# Patient Record
Sex: MALE | Employment: FULL TIME | URBAN - NONMETROPOLITAN AREA
[De-identification: names, ages, dates, MRNs, and addresses within clinical notes are randomized per-mention and may not be internally consistent; named-entity substitution may affect disease eponyms.]

---

## 2023-04-24 ENCOUNTER — OFFICE VISIT (OUTPATIENT)
Dept: PRIMARY CARE | Facility: CLINIC | Age: 53
End: 2023-04-24
Payer: COMMERCIAL

## 2023-04-24 VITALS
SYSTOLIC BLOOD PRESSURE: 158 MMHG | HEART RATE: 76 BPM | BODY MASS INDEX: 27.62 KG/M2 | HEIGHT: 71 IN | WEIGHT: 197.3 LBS | DIASTOLIC BLOOD PRESSURE: 84 MMHG

## 2023-04-24 DIAGNOSIS — F17.200 SMOKER: ICD-10-CM

## 2023-04-24 DIAGNOSIS — E78.5 HYPERLIPIDEMIA, UNSPECIFIED HYPERLIPIDEMIA TYPE: ICD-10-CM

## 2023-04-24 DIAGNOSIS — Z00.00 HEALTHCARE MAINTENANCE: ICD-10-CM

## 2023-04-24 DIAGNOSIS — I10 HYPERTENSION, UNSPECIFIED TYPE: ICD-10-CM

## 2023-04-24 DIAGNOSIS — I10 ESSENTIAL HYPERTENSION: ICD-10-CM

## 2023-04-24 DIAGNOSIS — F17.200 TOBACCO USE DISORDER: ICD-10-CM

## 2023-04-24 DIAGNOSIS — K21.9 GASTROESOPHAGEAL REFLUX DISEASE WITHOUT ESOPHAGITIS: ICD-10-CM

## 2023-04-24 DIAGNOSIS — R06.83 SNORING: Primary | ICD-10-CM

## 2023-04-24 PROBLEM — R31.9 HEMATURIA: Status: ACTIVE | Noted: 2023-04-24

## 2023-04-24 PROBLEM — M54.50 CHRONIC LOW BACK PAIN: Status: ACTIVE | Noted: 2023-04-24

## 2023-04-24 PROBLEM — I44.7 LEFT BUNDLE-BRANCH BLOCK: Status: ACTIVE | Noted: 2023-04-24

## 2023-04-24 PROBLEM — G89.29 CHRONIC LOW BACK PAIN: Status: ACTIVE | Noted: 2023-04-24

## 2023-04-24 PROCEDURE — 3079F DIAST BP 80-89 MM HG: CPT | Performed by: FAMILY MEDICINE

## 2023-04-24 PROCEDURE — 4004F PT TOBACCO SCREEN RCVD TLK: CPT | Performed by: FAMILY MEDICINE

## 2023-04-24 PROCEDURE — 99214 OFFICE O/P EST MOD 30 MIN: CPT | Performed by: FAMILY MEDICINE

## 2023-04-24 PROCEDURE — 3077F SYST BP >= 140 MM HG: CPT | Performed by: FAMILY MEDICINE

## 2023-04-24 RX ORDER — AMLODIPINE BESYLATE 10 MG/1
10 TABLET ORAL DAILY
Qty: 90 TABLET | Refills: 3 | Status: SHIPPED | OUTPATIENT
Start: 2023-04-24 | End: 2024-05-28

## 2023-04-24 RX ORDER — LISINOPRIL 10 MG/1
10 TABLET ORAL DAILY
Qty: 30 TABLET | Refills: 5 | Status: SHIPPED | OUTPATIENT
Start: 2023-04-24 | End: 2023-10-23

## 2023-04-24 RX ORDER — DOCUSATE SODIUM 100 MG/1
100 CAPSULE, LIQUID FILLED ORAL 2 TIMES DAILY
COMMUNITY

## 2023-04-24 RX ORDER — OMEPRAZOLE 20 MG/1
TABLET, DELAYED RELEASE ORAL
COMMUNITY
Start: 2017-03-20

## 2023-04-24 RX ORDER — IBUPROFEN 200 MG
1 CAPSULE ORAL DAILY
COMMUNITY

## 2023-04-24 RX ORDER — BUPROPION HYDROCHLORIDE 150 MG/1
150 TABLET, EXTENDED RELEASE ORAL DAILY
Qty: 180 TABLET | Refills: 3 | Status: SHIPPED | OUTPATIENT
Start: 2023-04-24 | End: 2024-05-06

## 2023-04-24 RX ORDER — ATORVASTATIN CALCIUM 20 MG/1
20 TABLET, FILM COATED ORAL DAILY
Qty: 90 TABLET | Refills: 3 | Status: SHIPPED | OUTPATIENT
Start: 2023-04-24 | End: 2024-04-29

## 2023-04-24 RX ORDER — ASCORBIC ACID 500 MG
TABLET ORAL
COMMUNITY

## 2023-04-24 NOTE — PATIENT INSTRUCTIONS
Add lisinopril 10 mg daily to regimen, call any concerns.  Followup one month with labs ( can be nonfasting or fasting for what we are checking) prior to the appt.  Someone will contact you regarding the sleep study.

## 2023-04-24 NOTE — PROGRESS NOTES
Patient presents for periodic surveillance of chronic medical problems.     Subjective  Josh Skinner is a 52 y.o. male who presents for Med check.  HPI  Htn, not yet at goal on current regimen, recommend addition of lisinopril 10 mg, side effects including cough and angioedema reviewed  Chest pain, has been to cardiology, not felt to have significant cardiac disease at this time  Tobacco use, warned of risks, advised to quit, on wellbutrin  GERD, stable  Snores, doesn't feel well rested, recommend sleep study and he is agreeable    Review of Systems   All other systems reviewed and are negative.  .    Objective     Visit Vitals  /84 (BP Location: Left arm, Patient Position: Sitting)   Pulse 76      Physical Exam  Vitals and nursing note reviewed.   Constitutional:       General: He is not in acute distress.     Appearance: Normal appearance. He is not toxic-appearing.   HENT:      Head: Normocephalic and atraumatic.   Cardiovascular:      Rate and Rhythm: Normal rate and regular rhythm.      Heart sounds: No murmur heard.  Pulmonary:      Effort: Pulmonary effort is normal.      Breath sounds: Normal breath sounds.   Musculoskeletal:      Cervical back: Neck supple. No rigidity.      Comments:     Skin:     General: Skin is warm and dry.   Neurological:      General: No focal deficit present.      Mental Status: He is alert and oriented to person, place, and time.   Psychiatric:         Mood and Affect: Mood normal.         Behavior: Behavior normal.         Assessment/Plan   Problem List Items Addressed This Visit          Circulatory    Essential hypertension    Relevant Medications    lisinopril 10 mg tablet    Other Relevant Orders    Home sleep apnea test (HSAT)    Follow Up In Primary Care    Basic metabolic panel       Digestive    Gastroesophageal reflux disease without esophagitis       Other    Smoker    Tobacco use disorder     Other Visit Diagnoses       Snoring    -  Primary    Relevant Orders     Home sleep apnea test (HSAT)                   Annamaria An MD

## 2023-04-25 NOTE — PROGRESS NOTES
NO PRECERT NEEDED. ORDER EMAILED TO RADHIKA AT [facility] TO SCHEDULE WITH PATIENT.

## 2023-05-09 ENCOUNTER — LAB (OUTPATIENT)
Dept: LAB | Facility: LAB | Age: 53
End: 2023-05-09
Payer: COMMERCIAL

## 2023-05-09 DIAGNOSIS — I10 ESSENTIAL HYPERTENSION: ICD-10-CM

## 2023-05-09 LAB
ANION GAP IN SER/PLAS: 8 MMOL/L (ref 10–20)
CALCIUM (MG/DL) IN SER/PLAS: 9.2 MG/DL (ref 8.6–10.3)
CARBON DIOXIDE, TOTAL (MMOL/L) IN SER/PLAS: 30 MMOL/L (ref 21–32)
CHLORIDE (MMOL/L) IN SER/PLAS: 107 MMOL/L (ref 98–107)
CREATININE (MG/DL) IN SER/PLAS: 1.02 MG/DL (ref 0.5–1.3)
GFR MALE: 88 ML/MIN/1.73M2
GLUCOSE (MG/DL) IN SER/PLAS: 82 MG/DL (ref 74–99)
POTASSIUM (MMOL/L) IN SER/PLAS: 4 MMOL/L (ref 3.5–5.3)
SODIUM (MMOL/L) IN SER/PLAS: 141 MMOL/L (ref 136–145)
UREA NITROGEN (MG/DL) IN SER/PLAS: 10 MG/DL (ref 6–23)

## 2023-05-09 PROCEDURE — 80048 BASIC METABOLIC PNL TOTAL CA: CPT

## 2023-05-09 PROCEDURE — 36415 COLL VENOUS BLD VENIPUNCTURE: CPT

## 2023-05-16 DIAGNOSIS — R06.83 SNORING: ICD-10-CM

## 2023-05-16 DIAGNOSIS — I10 ESSENTIAL HYPERTENSION: ICD-10-CM

## 2023-05-16 PROBLEM — G47.33 OBSTRUCTIVE SLEEP APNEA SYNDROME: Status: ACTIVE | Noted: 2023-05-16

## 2023-05-30 ENCOUNTER — OFFICE VISIT (OUTPATIENT)
Dept: PRIMARY CARE | Facility: CLINIC | Age: 53
End: 2023-05-30
Payer: COMMERCIAL

## 2023-05-30 VITALS
DIASTOLIC BLOOD PRESSURE: 60 MMHG | HEART RATE: 78 BPM | WEIGHT: 193 LBS | SYSTOLIC BLOOD PRESSURE: 134 MMHG | HEIGHT: 71 IN | BODY MASS INDEX: 27.02 KG/M2

## 2023-05-30 DIAGNOSIS — G47.33 OBSTRUCTIVE SLEEP APNEA SYNDROME: Primary | ICD-10-CM

## 2023-05-30 DIAGNOSIS — L23.7 ALLERGIC CONTACT DERMATITIS DUE TO PLANTS, EXCEPT FOOD: ICD-10-CM

## 2023-05-30 DIAGNOSIS — I10 ESSENTIAL HYPERTENSION: ICD-10-CM

## 2023-05-30 DIAGNOSIS — R73.09 ELEVATED GLUCOSE: ICD-10-CM

## 2023-05-30 PROCEDURE — 3075F SYST BP GE 130 - 139MM HG: CPT | Performed by: FAMILY MEDICINE

## 2023-05-30 PROCEDURE — 99214 OFFICE O/P EST MOD 30 MIN: CPT | Performed by: FAMILY MEDICINE

## 2023-05-30 PROCEDURE — 3078F DIAST BP <80 MM HG: CPT | Performed by: FAMILY MEDICINE

## 2023-05-30 PROCEDURE — 4004F PT TOBACCO SCREEN RCVD TLK: CPT | Performed by: FAMILY MEDICINE

## 2023-05-30 RX ORDER — METHYLPREDNISOLONE 4 MG/1
TABLET ORAL
Qty: 21 TABLET | Refills: 0 | Status: SHIPPED | OUTPATIENT
Start: 2023-05-30 | End: 2023-06-06

## 2023-05-30 NOTE — PROGRESS NOTES
Subjective  Josh Skinner is a 52 y.o. male who presents for 1 month follow up (Rev labs. Discuss poison ivy x 3 days. ).  HPI  Follow up adding lisinopril for hypertension.  First day felt quite lightheaded/like going to pass out, checked bp and got a systolic of 112.  That sensation has continued but to a lesser degree.  Recommend try taking the lisinopril at night, and call if symptoms do not completely resolve.  Goal for bp to be at goal with meds causing no symptoms.   Has poison ivy on arms, abdomen.  Discussed a medrol dose pack, side effects reviewed.  Sleep apnea, equipment set up pending, call concerns  BMP normal.  Review of Systems   All other systems reviewed and are negative.  .    Objective     Visit Vitals  /60 (BP Location: Left arm, Patient Position: Sitting)   Pulse 78      Physical Exam  Vitals reviewed.   Constitutional:       Appearance: Normal appearance.   HENT:      Head: Normocephalic.   Pulmonary:      Effort: Pulmonary effort is normal.   Skin:     Comments: Age related skin changes/sun damage posterior forearms  Rash forearms c/w contact dermatitis   Neurological:      General: No focal deficit present.      Mental Status: He is alert. Mental status is at baseline.   Psychiatric:         Mood and Affect: Mood normal.         Behavior: Behavior normal.       Lab on 05/09/2023   Component Date Value Ref Range Status    Glucose 05/09/2023 82  74 - 99 mg/dL Final    Sodium 05/09/2023 141  136 - 145 mmol/L Final    Potassium 05/09/2023 4.0  3.5 - 5.3 mmol/L Final    Chloride 05/09/2023 107  98 - 107 mmol/L Final    Bicarbonate 05/09/2023 30  21 - 32 mmol/L Final    Anion Gap 05/09/2023 8 (L)  10 - 20 mmol/L Final    Urea Nitrogen 05/09/2023 10  6 - 23 mg/dL Final    Creatinine 05/09/2023 1.02  0.50 - 1.30 mg/dL Final    GFR MALE 05/09/2023 88  >90 mL/min/1.73m2 Final     CALCULATIONS OF ESTIMATED GFR ARE PERFORMED   USING THE 2021 CKD-EPI STUDY REFIT EQUATION   WITHOUT THE RACE  VARIABLE FOR THE IDMS-TRACEABLE   CREATININE METHODS.    https://jasn.asnjournals.org/content/early/2021/09/22/ASN.1893929710    Calcium 05/09/2023 9.2  8.6 - 10.3 mg/dL Final         Assessment/Plan   Problem List Items Addressed This Visit          Nervous    Obstructive sleep apnea syndrome - Primary       Circulatory    Essential hypertension    Relevant Orders    Follow Up In Primary Care    CBC and Auto Differential    Comprehensive Metabolic Panel    Lipid Panel     Other Visit Diagnoses       Allergic contact dermatitis due to plants, except food        Relevant Medications    methylPREDNISolone (Medrol Dospak) 4 mg tablets    Elevated glucose        Relevant Orders    Hemoglobin A1C                   Annamaria An MD

## 2023-06-19 ENCOUNTER — TELEPHONE (OUTPATIENT)
Dept: PRIMARY CARE | Facility: CLINIC | Age: 53
End: 2023-06-19
Payer: COMMERCIAL

## 2023-07-03 ENCOUNTER — OFFICE VISIT (OUTPATIENT)
Dept: PRIMARY CARE | Facility: CLINIC | Age: 53
End: 2023-07-03
Payer: COMMERCIAL

## 2023-07-03 VITALS
HEIGHT: 71 IN | DIASTOLIC BLOOD PRESSURE: 74 MMHG | WEIGHT: 186 LBS | SYSTOLIC BLOOD PRESSURE: 120 MMHG | HEART RATE: 96 BPM | BODY MASS INDEX: 26.04 KG/M2

## 2023-07-03 DIAGNOSIS — Z20.818 EXPOSURE TO STREP THROAT: Primary | ICD-10-CM

## 2023-07-03 DIAGNOSIS — J02.9 SORE THROAT: ICD-10-CM

## 2023-07-03 PROCEDURE — 3074F SYST BP LT 130 MM HG: CPT | Performed by: FAMILY MEDICINE

## 2023-07-03 PROCEDURE — 3078F DIAST BP <80 MM HG: CPT | Performed by: FAMILY MEDICINE

## 2023-07-03 PROCEDURE — 4004F PT TOBACCO SCREEN RCVD TLK: CPT | Performed by: FAMILY MEDICINE

## 2023-07-03 PROCEDURE — 99213 OFFICE O/P EST LOW 20 MIN: CPT | Performed by: FAMILY MEDICINE

## 2023-07-03 RX ORDER — AMOXICILLIN 875 MG/1
875 TABLET, FILM COATED ORAL 2 TIMES DAILY
Qty: 20 TABLET | Refills: 0 | Status: SHIPPED | OUTPATIENT
Start: 2023-07-03 | End: 2023-07-13

## 2023-07-03 ASSESSMENT — ENCOUNTER SYMPTOMS: SORE THROAT: 1

## 2023-07-03 NOTE — PROGRESS NOTES
Subjective  Josh Skinner is a 52 y.o. male who presents for Sore Throat.  Sore Throat       Josh presents with illness started around Thursday. Sore trhoat, low grade fevers.  Grandchild who lives in the home tested positive for strep the week prior.   Appetite okay, swallowing secretions okay.    Review of Systems   HENT:  Positive for sore throat.    All other systems reviewed and are negative.  .    Objective     Visit Vitals  /74 (BP Location: Left arm, Patient Position: Sitting)   Pulse 96      Physical Exam  Vitals and nursing note reviewed.   Constitutional:       General: He is not in acute distress.     Appearance: Normal appearance. He is not toxic-appearing.   HENT:      Head: Normocephalic and atraumatic.      Mouth/Throat:      Mouth: Mucous membranes are moist.      Pharynx: Oropharyngeal exudate and posterior oropharyngeal erythema present.   Cardiovascular:      Rate and Rhythm: Normal rate and regular rhythm.      Heart sounds: No murmur heard.  Pulmonary:      Effort: Pulmonary effort is normal.      Breath sounds: Normal breath sounds.   Abdominal:      Palpations: Abdomen is soft.   Musculoskeletal:      Cervical back: Neck supple. No rigidity.      Comments: Normal gait   Skin:     General: Skin is warm and dry.   Neurological:      General: No focal deficit present.      Mental Status: He is alert and oriented to person, place, and time.   Psychiatric:         Mood and Affect: Mood normal.         Behavior: Behavior normal.         Assessment/Plan   Problem List Items Addressed This Visit    None  Visit Diagnoses       Exposure to strep throat    -  Primary    Relevant Medications    amoxicillin (Amoxil) 875 mg tablet    Sore throat        Relevant Medications    amoxicillin (Amoxil) 875 mg tablet                   Annamaria An MD

## 2023-07-03 NOTE — PROGRESS NOTES
C/O sore throat that started 6/30/23; grandchild lives with him who recently swabbed positive for strep; had fever over the weekend

## 2023-10-22 DIAGNOSIS — I10 ESSENTIAL HYPERTENSION: ICD-10-CM

## 2023-10-23 RX ORDER — LISINOPRIL 10 MG/1
10 TABLET ORAL DAILY
Qty: 90 TABLET | Refills: 1 | Status: SHIPPED | OUTPATIENT
Start: 2023-10-23 | End: 2023-11-30 | Stop reason: SDUPTHER

## 2023-11-30 ENCOUNTER — OFFICE VISIT (OUTPATIENT)
Dept: PRIMARY CARE | Facility: CLINIC | Age: 53
End: 2023-11-30
Payer: COMMERCIAL

## 2023-11-30 VITALS
OXYGEN SATURATION: 99 % | BODY MASS INDEX: 26.26 KG/M2 | SYSTOLIC BLOOD PRESSURE: 158 MMHG | WEIGHT: 187.6 LBS | HEART RATE: 81 BPM | DIASTOLIC BLOOD PRESSURE: 74 MMHG | HEIGHT: 71 IN

## 2023-11-30 DIAGNOSIS — Z28.21 PNEUMOCOCCAL VACCINATION DECLINED: ICD-10-CM

## 2023-11-30 DIAGNOSIS — F17.200 TOBACCO USE DISORDER: ICD-10-CM

## 2023-11-30 DIAGNOSIS — Z28.21 INFLUENZA VACCINATION DECLINED: Primary | ICD-10-CM

## 2023-11-30 DIAGNOSIS — I10 ESSENTIAL HYPERTENSION: ICD-10-CM

## 2023-11-30 DIAGNOSIS — K21.9 GASTROESOPHAGEAL REFLUX DISEASE WITHOUT ESOPHAGITIS: ICD-10-CM

## 2023-11-30 DIAGNOSIS — R26.89 BALANCE PROBLEMS: ICD-10-CM

## 2023-11-30 PROCEDURE — 4004F PT TOBACCO SCREEN RCVD TLK: CPT | Performed by: FAMILY MEDICINE

## 2023-11-30 PROCEDURE — 3078F DIAST BP <80 MM HG: CPT | Performed by: FAMILY MEDICINE

## 2023-11-30 PROCEDURE — 99214 OFFICE O/P EST MOD 30 MIN: CPT | Performed by: FAMILY MEDICINE

## 2023-11-30 PROCEDURE — 3077F SYST BP >= 140 MM HG: CPT | Performed by: FAMILY MEDICINE

## 2023-11-30 RX ORDER — LISINOPRIL 20 MG/1
20 TABLET ORAL DAILY
Qty: 30 TABLET | Refills: 2 | Status: SHIPPED | OUTPATIENT
Start: 2023-11-30 | End: 2024-02-26

## 2023-11-30 NOTE — PROGRESS NOTES
Patient presents for periodic surveillance of chronic medical problems.     Subjective  Josh Skinner is a 53 y.o. male who presents for No chief complaint on file..  HPI  Htn, not at goal, recommend increase lisinopril from 10 to 20 mg and followup in a month  On statin due to vascular risk, due to reassess lipids  GERD, stable on regimen  Smokes, warned of risks, advised to quit. Is interested in quitting and has tried a few times, on wellbutrin.  Has had for about a month a feeling of being off balance when he is standing still.  He has not fallen. Does not have it when seated or lying.  No spinning sensation.  Feels like going to fall but doesn't.  No chest pain, no shortness of breath, no vision changes, no spinning sensation.  Had cardiac workup about a year ago.      Review of Systems   All other systems reviewed and are negative.  .    Objective     Visit Vitals  /74 (BP Location: Left arm, Patient Position: Sitting, BP Cuff Size: Adult)   Pulse 81      Physical Exam  Vitals and nursing note reviewed.   Constitutional:       General: He is not in acute distress.     Appearance: Normal appearance. He is not toxic-appearing.   HENT:      Head: Normocephalic and atraumatic.   Cardiovascular:      Rate and Rhythm: Normal rate and regular rhythm.      Heart sounds: No murmur heard.  Pulmonary:      Effort: Pulmonary effort is normal.      Breath sounds: Normal breath sounds.   Musculoskeletal:      Cervical back: Neck supple. No rigidity.      Comments: Normal gait   Skin:     General: Skin is warm and dry.   Neurological:      General: No focal deficit present.      Mental Status: He is alert and oriented to person, place, and time.   Psychiatric:         Mood and Affect: Mood normal.         Behavior: Behavior normal.         Assessment/Plan   Problem List Items Addressed This Visit       Essential hypertension    Relevant Medications    lisinopril 20 mg tablet    Gastroesophageal reflux disease without  esophagitis    Tobacco use disorder     Other Visit Diagnoses       Influenza vaccination declined    -  Primary    Pneumococcal vaccination declined        Balance problems                       Annamaria An MD

## 2023-11-30 NOTE — PROGRESS NOTES
Subjective   Patient ID: Josh Skinner is a 53 y.o. male who presents for 6 month follow up. Patient states he has been having some balance issues x1 month. Denies falling     HPI     Review of Systems    Objective   There were no vitals taken for this visit.    Physical Exam    Assessment/Plan

## 2023-11-30 NOTE — PATIENT INSTRUCTIONS
Discussed workup for balance, and option of waiting until follow up to reassess.  Opted for reassess at follow up in one month unless symptoms worsen.  Call concerns.  Labs prior, to follow up in one month.    Increase dose of lisiopril from 10 mg to 20 mg daily.

## 2023-12-19 ENCOUNTER — LAB (OUTPATIENT)
Dept: LAB | Facility: LAB | Age: 53
End: 2023-12-19
Payer: COMMERCIAL

## 2023-12-19 DIAGNOSIS — I10 ESSENTIAL HYPERTENSION: ICD-10-CM

## 2023-12-19 DIAGNOSIS — R73.09 ELEVATED GLUCOSE: ICD-10-CM

## 2023-12-19 LAB
ALBUMIN SERPL BCP-MCNC: 4.2 G/DL (ref 3.4–5)
ALP SERPL-CCNC: 82 U/L (ref 33–120)
ALT SERPL W P-5'-P-CCNC: 25 U/L (ref 10–52)
ANION GAP SERPL CALC-SCNC: 9 MMOL/L (ref 10–20)
AST SERPL W P-5'-P-CCNC: 23 U/L (ref 9–39)
BASOPHILS # BLD AUTO: 0.09 X10*3/UL (ref 0–0.1)
BASOPHILS NFR BLD AUTO: 1.1 %
BILIRUB SERPL-MCNC: 0.4 MG/DL (ref 0–1.2)
BUN SERPL-MCNC: 15 MG/DL (ref 6–23)
CALCIUM SERPL-MCNC: 9 MG/DL (ref 8.6–10.3)
CHLORIDE SERPL-SCNC: 107 MMOL/L (ref 98–107)
CHOLEST SERPL-MCNC: 102 MG/DL (ref 0–199)
CHOLESTEROL/HDL RATIO: 2.5
CO2 SERPL-SCNC: 31 MMOL/L (ref 21–32)
CREAT SERPL-MCNC: 0.99 MG/DL (ref 0.5–1.3)
EOSINOPHIL # BLD AUTO: 0.27 X10*3/UL (ref 0–0.7)
EOSINOPHIL NFR BLD AUTO: 3.2 %
ERYTHROCYTE [DISTWIDTH] IN BLOOD BY AUTOMATED COUNT: 12 % (ref 11.5–14.5)
EST. AVERAGE GLUCOSE BLD GHB EST-MCNC: 108 MG/DL
GFR SERPL CREATININE-BSD FRML MDRD: >90 ML/MIN/1.73M*2
GLUCOSE SERPL-MCNC: 79 MG/DL (ref 74–99)
HBA1C MFR BLD: 5.4 %
HCT VFR BLD AUTO: 43.3 % (ref 41–52)
HDLC SERPL-MCNC: 41 MG/DL
HGB BLD-MCNC: 14.4 G/DL (ref 13.5–17.5)
IMM GRANULOCYTES # BLD AUTO: 0.02 X10*3/UL (ref 0–0.7)
IMM GRANULOCYTES NFR BLD AUTO: 0.2 % (ref 0–0.9)
LDLC SERPL CALC-MCNC: 48 MG/DL
LYMPHOCYTES # BLD AUTO: 1.52 X10*3/UL (ref 1.2–4.8)
LYMPHOCYTES NFR BLD AUTO: 18.1 %
MCH RBC QN AUTO: 31.3 PG (ref 26–34)
MCHC RBC AUTO-ENTMCNC: 33.3 G/DL (ref 32–36)
MCV RBC AUTO: 94 FL (ref 80–100)
MONOCYTES # BLD AUTO: 0.71 X10*3/UL (ref 0.1–1)
MONOCYTES NFR BLD AUTO: 8.4 %
NEUTROPHILS # BLD AUTO: 5.8 X10*3/UL (ref 1.2–7.7)
NEUTROPHILS NFR BLD AUTO: 69 %
NON HDL CHOLESTEROL: 61 MG/DL (ref 0–149)
NRBC BLD-RTO: 0 /100 WBCS (ref 0–0)
PLATELET # BLD AUTO: 268 X10*3/UL (ref 150–450)
POTASSIUM SERPL-SCNC: 4 MMOL/L (ref 3.5–5.3)
PROT SERPL-MCNC: 6.3 G/DL (ref 6.4–8.2)
RBC # BLD AUTO: 4.6 X10*6/UL (ref 4.5–5.9)
SODIUM SERPL-SCNC: 143 MMOL/L (ref 136–145)
TRIGL SERPL-MCNC: 64 MG/DL (ref 0–149)
VLDL: 13 MG/DL (ref 0–40)
WBC # BLD AUTO: 8.4 X10*3/UL (ref 4.4–11.3)

## 2023-12-19 PROCEDURE — 85025 COMPLETE CBC W/AUTO DIFF WBC: CPT

## 2023-12-19 PROCEDURE — 36415 COLL VENOUS BLD VENIPUNCTURE: CPT

## 2023-12-19 PROCEDURE — 80061 LIPID PANEL: CPT

## 2023-12-19 PROCEDURE — 80053 COMPREHEN METABOLIC PANEL: CPT

## 2023-12-19 PROCEDURE — 83036 HEMOGLOBIN GLYCOSYLATED A1C: CPT

## 2023-12-21 ENCOUNTER — OFFICE VISIT (OUTPATIENT)
Dept: PRIMARY CARE | Facility: CLINIC | Age: 53
End: 2023-12-21
Payer: COMMERCIAL

## 2023-12-21 VITALS
SYSTOLIC BLOOD PRESSURE: 128 MMHG | BODY MASS INDEX: 25.9 KG/M2 | DIASTOLIC BLOOD PRESSURE: 78 MMHG | WEIGHT: 185 LBS | HEART RATE: 88 BPM | HEIGHT: 71 IN

## 2023-12-21 DIAGNOSIS — I10 ESSENTIAL HYPERTENSION: Primary | ICD-10-CM

## 2023-12-21 DIAGNOSIS — R26.89 BALANCE PROBLEMS: ICD-10-CM

## 2023-12-21 DIAGNOSIS — F17.200 TOBACCO USE DISORDER: ICD-10-CM

## 2023-12-21 PROCEDURE — 3078F DIAST BP <80 MM HG: CPT | Performed by: FAMILY MEDICINE

## 2023-12-21 PROCEDURE — 99214 OFFICE O/P EST MOD 30 MIN: CPT | Performed by: FAMILY MEDICINE

## 2023-12-21 PROCEDURE — 4004F PT TOBACCO SCREEN RCVD TLK: CPT | Performed by: FAMILY MEDICINE

## 2023-12-21 PROCEDURE — 3074F SYST BP LT 130 MM HG: CPT | Performed by: FAMILY MEDICINE

## 2023-12-21 NOTE — PROGRESS NOTES
Subjective  Josh Skinner is a 53 y.o. male who presents for Med check.  HPI  Here for followup hypertension, tolerating increased dose of lisinopril fine.  BP check at home was fine, 124.  Still has the feeling off balance, states it is not worse, discussed referral and he states at this point its not bothersome enough to do anything about.  Will monitor, and he agrees to call if something changes  Reviewed labs. Lipids on statin very well controlled.  Review of Systems   All other systems reviewed and are negative.  .    Objective     Visit Vitals  /78   Pulse 88      Physical Exam  Vitals and nursing note reviewed.   Constitutional:       General: He is not in acute distress.     Appearance: Normal appearance. He is not toxic-appearing.   HENT:      Head: Normocephalic and atraumatic.   Cardiovascular:      Rate and Rhythm: Normal rate and regular rhythm.      Heart sounds: No murmur heard.  Pulmonary:      Effort: Pulmonary effort is normal.      Breath sounds: Normal breath sounds.   Musculoskeletal:      Cervical back: Neck supple. No rigidity.      Comments:     Skin:     General: Skin is warm and dry.   Neurological:      General: No focal deficit present.      Mental Status: He is alert and oriented to person, place, and time.   Psychiatric:         Mood and Affect: Mood normal.         Behavior: Behavior normal.         Assessment/Plan   Problem List Items Addressed This Visit       Essential hypertension - Primary    Tobacco use disorder     Other Visit Diagnoses       Balance problems                       Annamaria An MD

## 2024-02-25 DIAGNOSIS — I10 ESSENTIAL HYPERTENSION: ICD-10-CM

## 2024-02-26 RX ORDER — LISINOPRIL 20 MG/1
20 TABLET ORAL DAILY
Qty: 90 TABLET | Refills: 1 | Status: SHIPPED | OUTPATIENT
Start: 2024-02-26

## 2024-04-27 DIAGNOSIS — E78.5 HYPERLIPIDEMIA, UNSPECIFIED HYPERLIPIDEMIA TYPE: ICD-10-CM

## 2024-04-29 RX ORDER — ATORVASTATIN CALCIUM 20 MG/1
20 TABLET, FILM COATED ORAL DAILY
Qty: 90 TABLET | Refills: 3 | Status: SHIPPED | OUTPATIENT
Start: 2024-04-29

## 2024-05-03 DIAGNOSIS — Z00.00 HEALTHCARE MAINTENANCE: ICD-10-CM

## 2024-05-06 RX ORDER — BUPROPION HYDROCHLORIDE 150 MG/1
150 TABLET, EXTENDED RELEASE ORAL DAILY
Qty: 90 TABLET | Refills: 1 | Status: SHIPPED | OUTPATIENT
Start: 2024-05-06

## 2024-05-24 DIAGNOSIS — I10 HYPERTENSION, UNSPECIFIED TYPE: ICD-10-CM

## 2024-05-28 RX ORDER — AMLODIPINE BESYLATE 10 MG/1
10 TABLET ORAL DAILY
Qty: 90 TABLET | Refills: 3 | Status: SHIPPED | OUTPATIENT
Start: 2024-05-28

## 2024-06-20 ENCOUNTER — LAB (OUTPATIENT)
Dept: LAB | Facility: LAB | Age: 54
End: 2024-06-20
Payer: COMMERCIAL

## 2024-06-20 ENCOUNTER — APPOINTMENT (OUTPATIENT)
Dept: PRIMARY CARE | Facility: CLINIC | Age: 54
End: 2024-06-20
Payer: COMMERCIAL

## 2024-06-20 VITALS
HEIGHT: 71 IN | HEART RATE: 76 BPM | SYSTOLIC BLOOD PRESSURE: 138 MMHG | DIASTOLIC BLOOD PRESSURE: 76 MMHG | WEIGHT: 194 LBS | BODY MASS INDEX: 27.16 KG/M2

## 2024-06-20 DIAGNOSIS — R25.2 LEG CRAMPS: ICD-10-CM

## 2024-06-20 DIAGNOSIS — R09.89 DIMINISHED PULSE: ICD-10-CM

## 2024-06-20 DIAGNOSIS — Z00.00 HEALTHCARE MAINTENANCE: ICD-10-CM

## 2024-06-20 DIAGNOSIS — I10 ESSENTIAL HYPERTENSION: ICD-10-CM

## 2024-06-20 DIAGNOSIS — L98.9 SKIN LESION: Primary | ICD-10-CM

## 2024-06-20 DIAGNOSIS — F17.200 TOBACCO USE DISORDER: ICD-10-CM

## 2024-06-20 LAB
ALBUMIN SERPL BCP-MCNC: 4.4 G/DL (ref 3.4–5)
ALP SERPL-CCNC: 70 U/L (ref 33–120)
ALT SERPL W P-5'-P-CCNC: 15 U/L (ref 10–52)
ANION GAP SERPL CALC-SCNC: 7 MMOL/L (ref 10–20)
AST SERPL W P-5'-P-CCNC: 15 U/L (ref 9–39)
BILIRUB SERPL-MCNC: 0.4 MG/DL (ref 0–1.2)
BUN SERPL-MCNC: 12 MG/DL (ref 6–23)
CALCIUM SERPL-MCNC: 9.4 MG/DL (ref 8.6–10.3)
CHLORIDE SERPL-SCNC: 106 MMOL/L (ref 98–107)
CO2 SERPL-SCNC: 31 MMOL/L (ref 21–32)
CREAT SERPL-MCNC: 0.94 MG/DL (ref 0.5–1.3)
EGFRCR SERPLBLD CKD-EPI 2021: >90 ML/MIN/1.73M*2
GLUCOSE SERPL-MCNC: 83 MG/DL (ref 74–99)
MAGNESIUM SERPL-MCNC: 1.98 MG/DL (ref 1.6–2.4)
POTASSIUM SERPL-SCNC: 4.1 MMOL/L (ref 3.5–5.3)
PROT SERPL-MCNC: 6.7 G/DL (ref 6.4–8.2)
SODIUM SERPL-SCNC: 140 MMOL/L (ref 136–145)
TSH SERPL-ACNC: 0.57 MIU/L (ref 0.44–3.98)

## 2024-06-20 PROCEDURE — 99214 OFFICE O/P EST MOD 30 MIN: CPT | Performed by: FAMILY MEDICINE

## 2024-06-20 PROCEDURE — 84443 ASSAY THYROID STIM HORMONE: CPT

## 2024-06-20 PROCEDURE — 3075F SYST BP GE 130 - 139MM HG: CPT | Performed by: FAMILY MEDICINE

## 2024-06-20 PROCEDURE — 3078F DIAST BP <80 MM HG: CPT | Performed by: FAMILY MEDICINE

## 2024-06-20 PROCEDURE — 80053 COMPREHEN METABOLIC PANEL: CPT

## 2024-06-20 PROCEDURE — 83735 ASSAY OF MAGNESIUM: CPT

## 2024-06-20 PROCEDURE — 4004F PT TOBACCO SCREEN RCVD TLK: CPT | Performed by: FAMILY MEDICINE

## 2024-06-20 PROCEDURE — 36415 COLL VENOUS BLD VENIPUNCTURE: CPT

## 2024-06-20 RX ORDER — BUPROPION HYDROCHLORIDE 150 MG/1
150 TABLET ORAL EVERY MORNING
Qty: 30 TABLET | Refills: 3 | Status: SHIPPED | OUTPATIENT
Start: 2024-06-20

## 2024-06-20 NOTE — PROGRESS NOTES
Med check; states has been out of bupropion for a month; would like to restart it to try and quit smoking

## 2024-06-20 NOTE — PROGRESS NOTES
Patient presents for periodic surveillance of chronic medical problems.     Subjective  Josh Skinner is a 53 y.o. male who presents for Annual Exam.  HPI  New concerns- Skin lesios on left leg  Leg cramps day and night  Leg pain with walking. , calves  , feels like stopping to rest but doesn't     Htn, hyperlipdemia, stable  Smokes, states smoking more than ever now with stress at work, tried wellbutrin in the past and wants to try again.  He tried chantix years ago and did not tolerate.  Aware of risks and cessation advised    Recommend Prevnar 20.  Recommend Shingrix series.  Review of Systems   All other systems reviewed and are negative.      Current Outpatient Medications:     amLODIPine (Norvasc) 10 mg tablet, take 1 tablet by mouth once daily, Disp: 90 tablet, Rfl: 3    ascorbic acid (Vitamin C) 500 mg tablet, Take by mouth., Disp: , Rfl:     atorvastatin (Lipitor) 20 mg tablet, take 1 tablet by mouth once daily, Disp: 90 tablet, Rfl: 3    calcium citrate (Calcitrate) 200 mg (950 mg) tablet, Take 1 tablet (200 mg) by mouth once daily., Disp: , Rfl:     docusate sodium (Colace) 100 mg capsule, Take 1 capsule (100 mg) by mouth 2 times a day., Disp: , Rfl:     lisinopril 20 mg tablet, take 1 tablet by mouth once daily, Disp: 90 tablet, Rfl: 1    omeprazole OTC (PriLOSEC OTC) 20 mg EC tablet, TAKE AS DIRECTED, Disp: , Rfl:     buPROPion XL (Wellbutrin XL) 150 mg 24 hr tablet, Take 1 tablet (150 mg) by mouth once daily in the morning. Do not crush, chew, or split., Disp: 30 tablet, Rfl: 3     Patient Active Problem List   Diagnosis    Chronic low back pain    Essential hypertension    Gastroesophageal reflux disease without esophagitis    Hematuria    Left bundle-branch block    Tobacco use disorder    Obstructive sleep apnea syndrome      Objective     Visit Vitals  /76 (BP Location: Left arm, Patient Position: Sitting)   Pulse 76      Physical Exam  Vitals reviewed.   HENT:      Head: Normocephalic.    Eyes:      General: No scleral icterus.  Cardiovascular:      Rate and Rhythm: Normal rate and regular rhythm.      Comments: Good cap refill on toes, hair on toes, diminished dp pulses  bilaterally    Pulmonary:      Effort: Pulmonary effort is normal.      Breath sounds: Normal breath sounds.   Skin:     Coloration: Skin is not pale.      Comments: Varicosities , superficial on lower legs, oblong plaque lesion 2 cm posterior right calf   Neurological:      General: No focal deficit present.      Mental Status: He is alert.   Psychiatric:         Mood and Affect: Mood normal.         Assessment/Plan   Problem List Items Addressed This Visit       Essential hypertension    Relevant Orders    CBC and Auto Differential    Comprehensive Metabolic Panel    Lipid Panel    Tobacco use disorder     Other Visit Diagnoses       Skin lesion    -  Primary    Relevant Orders    Referral to Dermatology    Leg cramps        Relevant Orders    Magnesium    Comprehensive Metabolic Panel    TSH with reflex to Free T4 if abnormal    Diminished pulse        Relevant Orders    Vascular US ankle brachial index (ANDREW) without exercise    Healthcare maintenance        Relevant Medications    buPROPion XL (Wellbutrin XL) 150 mg 24 hr tablet             Followup 6 mos, labs today, labs prior, call concerns.       Annamaria An MD

## 2024-06-21 ENCOUNTER — HOSPITAL ENCOUNTER (OUTPATIENT)
Dept: VASCULAR MEDICINE | Facility: HOSPITAL | Age: 54
Discharge: HOME | End: 2024-06-21
Payer: COMMERCIAL

## 2024-06-21 DIAGNOSIS — I73.9 PERIPHERAL VASCULAR DISEASE, UNSPECIFIED (CMS-HCC): ICD-10-CM

## 2024-06-21 DIAGNOSIS — R09.89 DIMINISHED PULSE: ICD-10-CM

## 2024-06-21 PROCEDURE — 93922 UPR/L XTREMITY ART 2 LEVELS: CPT

## 2024-08-08 ENCOUNTER — APPOINTMENT (OUTPATIENT)
Dept: CARDIOLOGY | Facility: CLINIC | Age: 54
End: 2024-08-08
Payer: COMMERCIAL

## 2024-08-08 VITALS
WEIGHT: 193 LBS | OXYGEN SATURATION: 98 % | HEART RATE: 81 BPM | DIASTOLIC BLOOD PRESSURE: 76 MMHG | SYSTOLIC BLOOD PRESSURE: 142 MMHG | BODY MASS INDEX: 27.02 KG/M2 | HEIGHT: 71 IN

## 2024-08-08 DIAGNOSIS — I10 ESSENTIAL HYPERTENSION: ICD-10-CM

## 2024-08-08 DIAGNOSIS — R07.89 CHEST DISCOMFORT: Primary | ICD-10-CM

## 2024-08-08 PROCEDURE — 99213 OFFICE O/P EST LOW 20 MIN: CPT | Performed by: STUDENT IN AN ORGANIZED HEALTH CARE EDUCATION/TRAINING PROGRAM

## 2024-08-08 PROCEDURE — 3077F SYST BP >= 140 MM HG: CPT | Performed by: STUDENT IN AN ORGANIZED HEALTH CARE EDUCATION/TRAINING PROGRAM

## 2024-08-08 PROCEDURE — 93000 ELECTROCARDIOGRAM COMPLETE: CPT | Performed by: STUDENT IN AN ORGANIZED HEALTH CARE EDUCATION/TRAINING PROGRAM

## 2024-08-08 PROCEDURE — 3008F BODY MASS INDEX DOCD: CPT | Performed by: STUDENT IN AN ORGANIZED HEALTH CARE EDUCATION/TRAINING PROGRAM

## 2024-08-08 PROCEDURE — 3078F DIAST BP <80 MM HG: CPT | Performed by: STUDENT IN AN ORGANIZED HEALTH CARE EDUCATION/TRAINING PROGRAM

## 2024-08-08 PROCEDURE — 4004F PT TOBACCO SCREEN RCVD TLK: CPT | Performed by: STUDENT IN AN ORGANIZED HEALTH CARE EDUCATION/TRAINING PROGRAM

## 2024-08-08 NOTE — PROGRESS NOTES
Cardiology Subsequent Encounter Clinic Note  Name: Josh Skinner  MRN: [id number]  : 1970  CC: Atypical chest discomfort    Active Issues:  Josh Skinner is a 53 y.o. male with a medical history of hypertension here for evaluation of the following complaints:     Chest discomfort  -EKG shows left bundle branch block; CT calcium score 0 in 2022  -Echocardiogram performed 2022 shows preserved biventricular function.  -History of premature CAD in the family     Continues to complain of chest discomfort that is fairly atypical.  It has happened once in the past 7 days; not related to exertion; and lasts for minutes to hours.  No changes with position/palpation/respiration.  He denies any excessive exertional dyspnea above his baseline.  Denies any orthopnea/PND.  No lower extremity edema.  Negative coronary CTA 2023.      Past Medical History  Past Medical History:   Diagnosis Date    Personal history of other diseases of the respiratory system 2022    History of pneumothorax       Past Surgical History  Past Surgical History:   Procedure Laterality Date    CT ANGIO CORONARY ART WITH HEARTFLOW IF SCORE >30%  2023    CT HEART CORONARY ANGIOGRAM 2023 DOCTOR OFFICE LEGACY       Medications  Current Outpatient Medications on File Prior to Visit   Medication Sig Dispense Refill    amLODIPine (Norvasc) 10 mg tablet take 1 tablet by mouth once daily 90 tablet 3    ascorbic acid (Vitamin C) 500 mg tablet Take by mouth.      atorvastatin (Lipitor) 20 mg tablet take 1 tablet by mouth once daily 90 tablet 3    buPROPion XL (Wellbutrin XL) 150 mg 24 hr tablet Take 1 tablet (150 mg) by mouth once daily in the morning. Do not crush, chew, or split. 30 tablet 3    calcium citrate (Calcitrate) 200 mg (950 mg) tablet Take 1 tablet (200 mg) by mouth once daily.      docusate sodium (Colace) 100 mg capsule Take 1 capsule (100 mg) by mouth 2 times a day.      lisinopril 20 mg tablet take 1  "tablet by mouth once daily 90 tablet 1    omeprazole OTC (PriLOSEC OTC) 20 mg EC tablet TAKE AS DIRECTED      buPROPion SR (Wellbutrin SR) 150 mg 12 hr tablet take 1 tablet by mouth once daily (Patient not taking: Reported on 8/8/2024) 30 tablet 1     No current facility-administered medications on file prior to visit.       Allergies  No Known Allergies    Social History  Social History     Tobacco Use    Smoking status: Every Day     Types: Cigarettes    Smokeless tobacco: Never   Vaping Use    Vaping status: Never Used   Substance Use Topics    Alcohol use: Yes    Drug use: Never       Family History  Family History   Problem Relation Name Age of Onset    No Known Problems Mother      Heart disease Father      Diabetes Father      Heart disease Brother         Physical Examination  Vitals: /76   Pulse 81   Ht 1.803 m (5' 11\")   Wt 87.5 kg (193 lb)   SpO2 98%   BMI 26.92 kg/m²   General: awake, alert and oriented. No acute distress.   Skin: Skin is warm, dry and intact without rashes or lesions. Appropriate color for ethnicity. Nail beds pink with no cyanosis or clubbing  HEENT: normocephalic, atraumatic; conjunctivae are clear without exudates or hemorrhage. Sclera is non-icteric. Eyelids are normal in appearance without swelling or lesions. Hearing intact. Nares are patent bilaterally. Moist mucous membranes.   Cardiovascular: Regular. No murmurs, gallops, or rubs are auscultated. S1 and S2 are heard and are of normal intensity. No JVD, no carotid bruits  Respiratory: Thorax symmetric. CTAB, breath sounds vesicular. No crackles, wheezes or ronchi.   Gastrointestinal: soft, non-distended, BS + x 4  Genitourinary: exam deferred  Musculoskeletal: moves all extremities  Extremities: pulses palpable bilaterally; no swelling or erythema; no edema  Neurological: alert & oriented x 3; no focal deficits  Psychiatric: appropriate mood and affect       Labs/Imaging/Procedures    Lab Results   Component Value " Date    HGB 14.4 12/19/2023    HGB 15.5 08/24/2022     12/19/2023    WBC 8.4 12/19/2023     06/20/2024    K 4.1 06/20/2024    CREATININE 0.94 06/20/2024    CREATININE 0.99 12/19/2023    CREATININE 1.02 05/09/2023    CREATININE 1.06 01/16/2023    CREATININE 1.06 08/24/2022    BUN 12 06/20/2024    CALCIUM 9.4 06/20/2024    LDLF 90 08/24/2022     No echocardiogram results found for the past 12 months  Vascular US ankle brachial index (ANDREW) without [place] [address]  Phone [phone], Fax [phone]       Vascular Lab Report     [facility] US ANKLE BRACHIAL INDEX (ANDREW) WITHOUT EXERCISE    Patient Name:      RICHAR Mckeon Physician: [id number] Cristela Marquez MD  Study Date:        6/21/2024           Ordering Provider: [id number] TACHO CAMPBELL  MRN/PID:           [id number]            Fellow:  Accession#:        [id number]        Technologist:      Kavya Chauhan RVT/AB  Date of Birth/Age: 1970 / 53      Technologist 2:                     years  Gender:            M                   Encounter#:        [id number]  Admission Status:  Outpatient          Location           [facility]                                         Performed:       Diagnosis/ICD: Peripheral vascular disease, unspecified-I73.9  Indication:    Diminished pulses  CPT Codes:     15987 Peripheral artery ANDREW Only       CONCLUSIONS:  Bilateral Lower PVR: No evidence of arterial occlusive disease bilaterally in the lower extremities at rest. Multiphasic flow is noted in the common femoral arteries, posterior tibial arteries and bilateral dorsalis pedis arteries.  Right Lower PVR: Normal digital perfusion noted.  Left Lower PVR: Normal digital perfusion noted.     Imaging & Doppler Findings:     RIGHT Lower PVR                Pressures Ratios  Right Posterior Tibial (Ankle) 148 mmHg   1.03  Right Dorsalis Pedis (Ankle)   140 mmHg  0.98  Right Digit (Great Toe)        127 mmHg  0.89          LEFT Lower PVR                Pressures Ratios  Left Posterior Tibial (Ankle) 144 mmHg  1.01  Left Dorsalis Pedis (Ankle)   131 mmHg  0.92  Left Digit (Great Toe)        129 mmHg  0.90                             Right     Left  Brachial Pressure 142 mmHg 143 mmHg          [id number] Cristela Marquez MD  Electronically signed by [id number] Cristela Marquez MD on 6/25/2024 at 1:59:56 PM       ** Final **      Impression  Josh Skinner is a 53 y.o. male  with a medical history of hypertension here for evaluation of the following complaints:     Chest discomfort  -EKG shows left frontal branch block; CT calcium score 0 in October 2022  -Echocardiogram performed October 2022 shows preserved biventricular function.  -History of premature CAD in the family     Assessment and plan:  -Chest discomfort appears to be fairly atypical. Moreover he has a negative CT coronary January 2023. No further evaluation required at present  -Encouraged tobacco cessation  -Mildly hypertensive currently; will observe  -RTC 1 year          Kushal Leon MD  Advanced Heart Failure/Transplant Cardiology  Cardio-Oncology  [facility] [place]

## 2024-09-03 DIAGNOSIS — I10 ESSENTIAL HYPERTENSION: ICD-10-CM

## 2024-09-03 RX ORDER — LISINOPRIL 20 MG/1
20 TABLET ORAL DAILY
Qty: 90 TABLET | Refills: 1 | Status: SHIPPED | OUTPATIENT
Start: 2024-09-03

## 2024-10-07 DIAGNOSIS — Z00.00 HEALTHCARE MAINTENANCE: ICD-10-CM

## 2024-10-07 RX ORDER — BUPROPION HYDROCHLORIDE 150 MG/1
150 TABLET ORAL DAILY
Qty: 30 TABLET | Refills: 3 | Status: SHIPPED | OUTPATIENT
Start: 2024-10-07

## 2024-12-20 ENCOUNTER — APPOINTMENT (OUTPATIENT)
Dept: PRIMARY CARE | Facility: CLINIC | Age: 54
End: 2024-12-20
Payer: COMMERCIAL

## 2024-12-20 VITALS
OXYGEN SATURATION: 99 % | SYSTOLIC BLOOD PRESSURE: 160 MMHG | HEART RATE: 86 BPM | DIASTOLIC BLOOD PRESSURE: 86 MMHG | WEIGHT: 194.6 LBS | BODY MASS INDEX: 27.14 KG/M2

## 2024-12-20 DIAGNOSIS — Z00.00 HEALTHCARE MAINTENANCE: ICD-10-CM

## 2024-12-20 DIAGNOSIS — E78.5 HYPERLIPIDEMIA, UNSPECIFIED HYPERLIPIDEMIA TYPE: ICD-10-CM

## 2024-12-20 DIAGNOSIS — F17.200 TOBACCO DEPENDENCE: Primary | ICD-10-CM

## 2024-12-20 DIAGNOSIS — I10 ESSENTIAL HYPERTENSION: ICD-10-CM

## 2024-12-20 DIAGNOSIS — Z28.21 INFLUENZA VACCINATION DECLINED: ICD-10-CM

## 2024-12-20 DIAGNOSIS — I10 HYPERTENSION, UNSPECIFIED TYPE: ICD-10-CM

## 2024-12-20 PROBLEM — Z56.6 STRESS AT WORK: Status: ACTIVE | Noted: 2024-12-20

## 2024-12-20 PROCEDURE — 3077F SYST BP >= 140 MM HG: CPT | Performed by: FAMILY MEDICINE

## 2024-12-20 PROCEDURE — 3079F DIAST BP 80-89 MM HG: CPT | Performed by: FAMILY MEDICINE

## 2024-12-20 PROCEDURE — 4004F PT TOBACCO SCREEN RCVD TLK: CPT | Performed by: FAMILY MEDICINE

## 2024-12-20 PROCEDURE — 99214 OFFICE O/P EST MOD 30 MIN: CPT | Performed by: FAMILY MEDICINE

## 2024-12-20 RX ORDER — AMLODIPINE BESYLATE 10 MG/1
10 TABLET ORAL DAILY
Qty: 90 TABLET | Refills: 3 | Status: SHIPPED | OUTPATIENT
Start: 2024-12-20

## 2024-12-20 RX ORDER — ATORVASTATIN CALCIUM 20 MG/1
20 TABLET, FILM COATED ORAL DAILY
Qty: 90 TABLET | Refills: 3 | Status: SHIPPED | OUTPATIENT
Start: 2024-12-20

## 2024-12-20 RX ORDER — VARENICLINE TARTRATE 1 MG/1
1 TABLET, FILM COATED ORAL 2 TIMES DAILY
Qty: 60 TABLET | Refills: 1 | Status: SHIPPED | OUTPATIENT
Start: 2024-12-20 | End: 2025-03-20

## 2024-12-20 RX ORDER — VARENICLINE TARTRATE 0.5 (11)-1
1 KIT ORAL 2 TIMES DAILY
Qty: 53 EACH | Refills: 0 | Status: SHIPPED | OUTPATIENT
Start: 2024-12-20 | End: 2025-03-20

## 2024-12-20 RX ORDER — BUPROPION HYDROCHLORIDE 150 MG/1
150 TABLET ORAL DAILY
Qty: 30 TABLET | Refills: 3 | Status: SHIPPED | OUTPATIENT
Start: 2024-12-20

## 2024-12-20 RX ORDER — LISINOPRIL 20 MG/1
20 TABLET ORAL 2 TIMES DAILY
Qty: 60 TABLET | Refills: 3 | Status: SHIPPED | OUTPATIENT
Start: 2024-12-20

## 2024-12-20 ASSESSMENT — PATIENT HEALTH QUESTIONNAIRE - PHQ9
1. LITTLE INTEREST OR PLEASURE IN DOING THINGS: NOT AT ALL
2. FEELING DOWN, DEPRESSED OR HOPELESS: NOT AT ALL
SUM OF ALL RESPONSES TO PHQ9 QUESTIONS 1 AND 2: 0

## 2024-12-20 NOTE — PROGRESS NOTES
Patient presents for periodic surveillance of chronic medical problems.     Subjective  Josh Skinner is a 54 y.o. male who presents for Follow-up (6 month).  HPI  Htn, not at goal.  States he feels his bp his lower on weekends when ehs not at work, can feel tired at times.  Recommend increasing to 20 bid and see how he tolerates for better bp control  Hyperlipidemia, on statin, due to reassess  Smokes, has been warned of risks, advised to quit.  Interested in trying chantix again, side effects discussed including black box warning    Declines influenza vaccine.  Recommend shingrix  Review of Systems   All other systems reviewed and are negative.  .    No Known Allergies    Current Outpatient Medications on File Prior to Visit   Medication Sig Dispense Refill    ascorbic acid (Vitamin C) 500 mg tablet Take by mouth.      calcium citrate (Calcitrate) 200 mg (950 mg) tablet Take 1 tablet (200 mg) by mouth once daily.      docusate sodium (Colace) 100 mg capsule Take 1 capsule (100 mg) by mouth 2 times a day.      omeprazole OTC (PriLOSEC OTC) 20 mg EC tablet TAKE AS DIRECTED      [DISCONTINUED] amLODIPine (Norvasc) 10 mg tablet take 1 tablet by mouth once daily 90 tablet 3    [DISCONTINUED] atorvastatin (Lipitor) 20 mg tablet take 1 tablet by mouth once daily 90 tablet 3    [DISCONTINUED] buPROPion XL (Wellbutrin XL) 150 mg 24 hr tablet take 1 tablet by mouth every morning DO NOT CRUSH, CHEW OR SPLIT 30 tablet 3    [DISCONTINUED] lisinopril 20 mg tablet take 1 tablet by mouth once daily 90 tablet 1    [DISCONTINUED] buPROPion SR (Wellbutrin SR) 150 mg 12 hr tablet take 1 tablet by mouth once daily (Patient not taking: Reported on 12/20/2024) 30 tablet 1     No current facility-administered medications on file prior to visit.       Patient Active Problem List   Diagnosis    Chronic low back pain    Essential hypertension    Gastroesophageal reflux disease without esophagitis    Hematuria    Left bundle-branch block     Tobacco use disorder    Obstructive sleep apnea syndrome    Stress at work       Objective     Visit Vitals  /86   Pulse 86      Physical Exam  Vitals and nursing note reviewed.   Constitutional:       General: He is not in acute distress.     Appearance: Normal appearance. He is not toxic-appearing.   HENT:      Head: Normocephalic and atraumatic.   Cardiovascular:      Rate and Rhythm: Normal rate and regular rhythm.      Heart sounds: No murmur heard.  Pulmonary:      Effort: Pulmonary effort is normal.      Breath sounds: Normal breath sounds.   Musculoskeletal:      Cervical back: Neck supple. No rigidity.      Comments:     Skin:     General: Skin is warm and dry.   Neurological:      General: No focal deficit present.      Mental Status: He is alert and oriented to person, place, and time.   Psychiatric:         Mood and Affect: Mood normal.         Behavior: Behavior normal.         Assessment/Plan   Problem List Items Addressed This Visit       Essential hypertension    Relevant Medications    lisinopril 20 mg tablet     Other Visit Diagnoses       Tobacco dependence    -  Primary    Relevant Medications    varenicline (Chantix Starting Month Box) 0.5 mg (11)- 1 mg (42) tablet    varenicline (Chantix Continuing Month Box) 1 mg tablet    Other Relevant Orders    CT lung screening low dose    Hypertension, unspecified type        Relevant Medications    amLODIPine (Norvasc) 10 mg tablet    Other Relevant Orders    Basic Metabolic Panel    Follow Up In Primary Care    Hyperlipidemia, unspecified hyperlipidemia type        Relevant Medications    atorvastatin (Lipitor) 20 mg tablet    Healthcare maintenance        Relevant Medications    buPROPion XL (Wellbutrin XL) 150 mg 24 hr tablet    Influenza vaccination declined                       Annamaria An MD

## 2024-12-26 ENCOUNTER — LAB (OUTPATIENT)
Dept: LAB | Facility: LAB | Age: 54
End: 2024-12-26
Payer: COMMERCIAL

## 2024-12-26 DIAGNOSIS — I10 ESSENTIAL HYPERTENSION: ICD-10-CM

## 2024-12-26 DIAGNOSIS — I10 HYPERTENSION, UNSPECIFIED TYPE: ICD-10-CM

## 2024-12-26 LAB
ANION GAP SERPL CALC-SCNC: 9 MMOL/L (ref 10–20)
BASOPHILS # BLD AUTO: 0.08 X10*3/UL (ref 0–0.1)
BASOPHILS NFR BLD AUTO: 1 %
BUN SERPL-MCNC: 13 MG/DL (ref 6–23)
CALCIUM SERPL-MCNC: 8.8 MG/DL (ref 8.6–10.3)
CHLORIDE SERPL-SCNC: 106 MMOL/L (ref 98–107)
CHOLEST SERPL-MCNC: 100 MG/DL (ref 0–199)
CHOLESTEROL/HDL RATIO: 2.3
CO2 SERPL-SCNC: 29 MMOL/L (ref 21–32)
CREAT SERPL-MCNC: 0.97 MG/DL (ref 0.5–1.3)
EGFRCR SERPLBLD CKD-EPI 2021: >90 ML/MIN/1.73M*2
EOSINOPHIL # BLD AUTO: 0.23 X10*3/UL (ref 0–0.7)
EOSINOPHIL NFR BLD AUTO: 3 %
ERYTHROCYTE [DISTWIDTH] IN BLOOD BY AUTOMATED COUNT: 11.9 % (ref 11.5–14.5)
GLUCOSE SERPL-MCNC: 98 MG/DL (ref 74–99)
HCT VFR BLD AUTO: 43.5 % (ref 41–52)
HDLC SERPL-MCNC: 44 MG/DL
HGB BLD-MCNC: 14.7 G/DL (ref 13.5–17.5)
IMM GRANULOCYTES # BLD AUTO: 0.02 X10*3/UL (ref 0–0.7)
IMM GRANULOCYTES NFR BLD AUTO: 0.3 % (ref 0–0.9)
LDLC SERPL CALC-MCNC: 46 MG/DL
LYMPHOCYTES # BLD AUTO: 2.24 X10*3/UL (ref 1.2–4.8)
LYMPHOCYTES NFR BLD AUTO: 28.9 %
MCH RBC QN AUTO: 31.1 PG (ref 26–34)
MCHC RBC AUTO-ENTMCNC: 33.8 G/DL (ref 32–36)
MCV RBC AUTO: 92 FL (ref 80–100)
MONOCYTES # BLD AUTO: 0.63 X10*3/UL (ref 0.1–1)
MONOCYTES NFR BLD AUTO: 8.1 %
NEUTROPHILS # BLD AUTO: 4.56 X10*3/UL (ref 1.2–7.7)
NEUTROPHILS NFR BLD AUTO: 58.7 %
NON HDL CHOLESTEROL: 56 MG/DL (ref 0–149)
NRBC BLD-RTO: 0 /100 WBCS (ref 0–0)
PLATELET # BLD AUTO: 320 X10*3/UL (ref 150–450)
POTASSIUM SERPL-SCNC: 4.3 MMOL/L (ref 3.5–5.3)
RBC # BLD AUTO: 4.72 X10*6/UL (ref 4.5–5.9)
SODIUM SERPL-SCNC: 140 MMOL/L (ref 136–145)
TRIGL SERPL-MCNC: 51 MG/DL (ref 0–149)
VLDL: 10 MG/DL (ref 0–40)
WBC # BLD AUTO: 7.8 X10*3/UL (ref 4.4–11.3)

## 2024-12-26 PROCEDURE — 80061 LIPID PANEL: CPT

## 2024-12-26 PROCEDURE — 80048 BASIC METABOLIC PNL TOTAL CA: CPT

## 2024-12-26 PROCEDURE — 85025 COMPLETE CBC W/AUTO DIFF WBC: CPT

## 2024-12-31 ENCOUNTER — HOSPITAL ENCOUNTER (OUTPATIENT)
Dept: RADIOLOGY | Facility: HOSPITAL | Age: 54
Discharge: HOME | End: 2024-12-31
Payer: COMMERCIAL

## 2024-12-31 DIAGNOSIS — F17.200 TOBACCO DEPENDENCE: ICD-10-CM

## 2024-12-31 PROCEDURE — 71271 CT THORAX LUNG CANCER SCR C-: CPT

## 2024-12-31 PROCEDURE — 71271 CT THORAX LUNG CANCER SCR C-: CPT | Performed by: RADIOLOGY

## 2025-01-20 ENCOUNTER — APPOINTMENT (OUTPATIENT)
Age: 55
End: 2025-01-20
Payer: COMMERCIAL

## 2025-01-20 VITALS
WEIGHT: 197 LBS | SYSTOLIC BLOOD PRESSURE: 134 MMHG | BODY MASS INDEX: 27.58 KG/M2 | HEIGHT: 71 IN | DIASTOLIC BLOOD PRESSURE: 76 MMHG | HEART RATE: 84 BPM

## 2025-01-20 DIAGNOSIS — I10 ESSENTIAL HYPERTENSION: Primary | ICD-10-CM

## 2025-01-20 PROCEDURE — 3078F DIAST BP <80 MM HG: CPT | Performed by: FAMILY MEDICINE

## 2025-01-20 PROCEDURE — 3008F BODY MASS INDEX DOCD: CPT | Performed by: FAMILY MEDICINE

## 2025-01-20 PROCEDURE — 3075F SYST BP GE 130 - 139MM HG: CPT | Performed by: FAMILY MEDICINE

## 2025-01-20 PROCEDURE — 99213 OFFICE O/P EST LOW 20 MIN: CPT | Performed by: FAMILY MEDICINE

## 2025-01-20 ASSESSMENT — PATIENT HEALTH QUESTIONNAIRE - PHQ9
2. FEELING DOWN, DEPRESSED OR HOPELESS: NOT AT ALL
1. LITTLE INTEREST OR PLEASURE IN DOING THINGS: NOT AT ALL
SUM OF ALL RESPONSES TO PHQ9 QUESTIONS 1 AND 2: 0

## 2025-03-03 DIAGNOSIS — I10 ESSENTIAL HYPERTENSION: ICD-10-CM

## 2025-03-03 RX ORDER — LISINOPRIL 20 MG/1
20 TABLET ORAL 2 TIMES DAILY
Qty: 60 TABLET | Refills: 11 | Status: SHIPPED | OUTPATIENT
Start: 2025-03-03 | End: 2025-03-04 | Stop reason: SDUPTHER

## 2025-03-04 DIAGNOSIS — I10 ESSENTIAL HYPERTENSION: ICD-10-CM

## 2025-03-04 RX ORDER — LISINOPRIL 20 MG/1
20 TABLET ORAL 2 TIMES DAILY
Qty: 60 TABLET | Refills: 11 | Status: SHIPPED | OUTPATIENT
Start: 2025-03-04

## 2025-06-22 DIAGNOSIS — Z00.00 HEALTHCARE MAINTENANCE: ICD-10-CM

## 2025-06-23 RX ORDER — BUPROPION HYDROCHLORIDE 150 MG/1
TABLET ORAL
Qty: 90 TABLET | Refills: 1 | Status: SHIPPED | OUTPATIENT
Start: 2025-06-23

## 2025-06-27 ENCOUNTER — OFFICE VISIT (OUTPATIENT)
Age: 55
End: 2025-06-27
Payer: COMMERCIAL

## 2025-06-27 VITALS
HEIGHT: 71 IN | SYSTOLIC BLOOD PRESSURE: 158 MMHG | WEIGHT: 210 LBS | DIASTOLIC BLOOD PRESSURE: 82 MMHG | BODY MASS INDEX: 29.4 KG/M2 | HEART RATE: 76 BPM

## 2025-06-27 DIAGNOSIS — R73.09 ELEVATED GLUCOSE: ICD-10-CM

## 2025-06-27 DIAGNOSIS — R22.43 LOCALIZED SWELLING OF BOTH LOWER LEGS: Primary | ICD-10-CM

## 2025-06-27 DIAGNOSIS — I10 ESSENTIAL HYPERTENSION: ICD-10-CM

## 2025-06-27 PROCEDURE — 3077F SYST BP >= 140 MM HG: CPT | Performed by: FAMILY MEDICINE

## 2025-06-27 PROCEDURE — 99214 OFFICE O/P EST MOD 30 MIN: CPT | Performed by: FAMILY MEDICINE

## 2025-06-27 PROCEDURE — 3079F DIAST BP 80-89 MM HG: CPT | Performed by: FAMILY MEDICINE

## 2025-06-27 PROCEDURE — 3008F BODY MASS INDEX DOCD: CPT | Performed by: FAMILY MEDICINE

## 2025-06-27 NOTE — PROGRESS NOTES
Subjective  Josh Skinner is a 54 y.o. male who presents for Edema.  HPI  Bilateral feet and ankle swelling for a few weeks.  Long car trip to and from cruise he went on, no swelling on cruise.  Right lower leg looks a a little bigger than left.  Quit smoking five months ago.  No new shortness of breath or chest pain.  Denies orthopnea or PND  On amlodipine for htn  Has appt fo routine check up 7/21.  Review of Systems   All other systems reviewed and are negative.  .    Allergies[1]    Medications Ordered Prior to Encounter[2]    Problem List[3]    Objective     Visit Vitals  /82 (BP Location: Left arm, Patient Position: Sitting)   Pulse 76      Physical Exam  HENT:      Head: Normocephalic.   Pulmonary:      Effort: Pulmonary effort is normal.   Musculoskeletal:      Comments: 1 plus pitting edema top of feet and bilateral ankles  No calf tenderness or swelling, negative homans bilaterally   Skin:     Coloration: Skin is not pale.   Neurological:      General: No focal deficit present.      Mental Status: He is alert.         Assessment/Plan   Problem List Items Addressed This Visit       Essential hypertension     Other Visit Diagnoses         Localized swelling of both lower legs    -  Primary    Relevant Orders    Vascular US lower extremity venous duplex bilateral    B-type natriuretic peptide    TSH with reflex to Free T4 if abnormal    Basic metabolic panel      Elevated glucose        Relevant Orders    Hemoglobin A1C             Extra water, elevation when sitting.  Followup pending testing.  Htn elevated reading, will monitor    Annamaria An MD          [1] No Known Allergies  [2]   Current Outpatient Medications on File Prior to Visit   Medication Sig Dispense Refill    amLODIPine (Norvasc) 10 mg tablet Take 1 tablet (10 mg) by mouth once daily. 90 tablet 3    ascorbic acid (Vitamin C) 500 mg tablet Take by mouth.      atorvastatin (Lipitor) 20 mg tablet Take 1 tablet (20 mg) by mouth once  daily. 90 tablet 3    buPROPion XL (Wellbutrin XL) 150 mg 24 hr tablet take 1 tablet by mouth once daily DO NOT CRUSH, CHEW, AND/OR DIVIDE 90 tablet 1    calcium citrate (Calcitrate) 200 mg (950 mg) tablet Take 1 tablet by mouth once daily.      docusate sodium (Colace) 100 mg capsule Take 1 capsule (100 mg) by mouth 2 times a day.      lisinopril 20 mg tablet Take 1 tablet (20 mg) by mouth 2 times a day. 60 tablet 11    omeprazole OTC (PriLOSEC OTC) 20 mg EC tablet TAKE AS DIRECTED      [DISCONTINUED] buPROPion XL (Wellbutrin XL) 150 mg 24 hr tablet Take 1 tablet (150 mg) by mouth once daily. Do not crush, chew, or split 30 tablet 3    [DISCONTINUED] varenicline (Chantix Continuing Month Box) 1 mg tablet Take 1 tablet (1 mg) by mouth 2 times a day. Take with full glass of water. 60 tablet 1    [DISCONTINUED] varenicline (Chantix Starting Month Box) 0.5 mg (11)- 1 mg (42) tablet Take 1 mg by mouth 2 times a day. 53 each 0     No current facility-administered medications on file prior to visit.   [3]   Patient Active Problem List  Diagnosis    Chronic low back pain    Essential hypertension    Gastroesophageal reflux disease without esophagitis    Hematuria    Left bundle-branch block    Tobacco use disorder    Obstructive sleep apnea syndrome    Stress at work

## 2025-07-01 ENCOUNTER — HOSPITAL ENCOUNTER (OUTPATIENT)
Dept: VASCULAR MEDICINE | Facility: HOSPITAL | Age: 55
Discharge: HOME | End: 2025-07-01
Payer: COMMERCIAL

## 2025-07-01 DIAGNOSIS — M79.89 OTHER SPECIFIED SOFT TISSUE DISORDERS: ICD-10-CM

## 2025-07-01 DIAGNOSIS — R22.43 LOCALIZED SWELLING OF BOTH LOWER LEGS: ICD-10-CM

## 2025-07-01 PROCEDURE — 93970 EXTREMITY STUDY: CPT

## 2025-07-01 PROCEDURE — 93970 EXTREMITY STUDY: CPT | Performed by: SURGERY

## 2025-07-21 ENCOUNTER — APPOINTMENT (OUTPATIENT)
Age: 55
End: 2025-07-21
Payer: COMMERCIAL

## 2025-07-21 VITALS
BODY MASS INDEX: 29.68 KG/M2 | DIASTOLIC BLOOD PRESSURE: 78 MMHG | HEART RATE: 76 BPM | HEIGHT: 71 IN | WEIGHT: 212 LBS | SYSTOLIC BLOOD PRESSURE: 138 MMHG

## 2025-07-21 DIAGNOSIS — Z12.11 SCREENING FOR COLON CANCER: ICD-10-CM

## 2025-07-21 DIAGNOSIS — R73.09 ELEVATED GLUCOSE: ICD-10-CM

## 2025-07-21 DIAGNOSIS — I87.2 CHRONIC VENOUS INSUFFICIENCY: ICD-10-CM

## 2025-07-21 DIAGNOSIS — J43.8 OTHER EMPHYSEMA (MULTI): Primary | ICD-10-CM

## 2025-07-21 DIAGNOSIS — I10 ESSENTIAL HYPERTENSION: ICD-10-CM

## 2025-07-21 PROBLEM — F17.200 TOBACCO USE DISORDER: Status: RESOLVED | Noted: 2023-04-24 | Resolved: 2025-07-21

## 2025-07-21 PROBLEM — Z87.891 FORMER SMOKER: Status: ACTIVE | Noted: 2025-07-21

## 2025-07-21 PROCEDURE — 3078F DIAST BP <80 MM HG: CPT | Performed by: FAMILY MEDICINE

## 2025-07-21 PROCEDURE — 99214 OFFICE O/P EST MOD 30 MIN: CPT | Performed by: FAMILY MEDICINE

## 2025-07-21 PROCEDURE — 3075F SYST BP GE 130 - 139MM HG: CPT | Performed by: FAMILY MEDICINE

## 2025-07-21 PROCEDURE — 3008F BODY MASS INDEX DOCD: CPT | Performed by: FAMILY MEDICINE

## 2025-07-21 RX ORDER — CHLORTHALIDONE 25 MG/1
12.5 TABLET ORAL DAILY
Qty: 15 TABLET | Refills: 3 | Status: SHIPPED | OUTPATIENT
Start: 2025-07-21 | End: 2025-08-20

## 2025-07-21 RX ORDER — ALBUTEROL SULFATE 90 UG/1
2 INHALANT RESPIRATORY (INHALATION) EVERY 4 HOURS PRN
Qty: 8 G | Refills: 5 | Status: SHIPPED | OUTPATIENT
Start: 2025-07-21 | End: 2026-07-21

## 2025-07-21 NOTE — PROGRESS NOTES
Patient presents for periodic surveillance of chronic medical problems.     Subjective  Josh Skinner is a 54 y.o. male who presents for Follow-up.  HPI  Htn,hyperlipidemia, copd, stable  Still having leg swelling, workup negative, clinically consistent with chronic venous insufficiency, recommend compression stockings, elevation, adequate fluid intake, and discussed trial of low dose diuretic, he is agreable.  Side effects discussed.  COPD, short of breath with exertion at times, recently quit smoking, discussed maintenance and rescue inhalers, agreeable to trial of rescue inhaler  Colon cancer screening coming up due, chooses cologuard.   Review of Systems   All other systems reviewed and are negative.  .    Allergies[1]    Medications Ordered Prior to Encounter[2]    Problem List[3]    Objective     Visit Vitals  /78 (BP Location: Left arm, Patient Position: Sitting)   Pulse 76      Physical Exam  Vitals and nursing note reviewed.   Constitutional:       General: He is not in acute distress.     Appearance: Normal appearance. He is not toxic-appearing.   HENT:      Head: Normocephalic and atraumatic.     Cardiovascular:      Rate and Rhythm: Normal rate and regular rhythm.      Heart sounds: No murmur heard.  Pulmonary:      Effort: Pulmonary effort is normal.      Breath sounds: Normal breath sounds.     Musculoskeletal:      Cervical back: Neck supple. No rigidity.      Comments:       Skin:     General: Skin is warm and dry.     Neurological:      General: No focal deficit present.      Mental Status: He is alert and oriented to person, place, and time.     Psychiatric:         Mood and Affect: Mood normal.         Behavior: Behavior normal.         Assessment/Plan   Problem List Items Addressed This Visit       Essential hypertension    Relevant Medications    chlorthalidone (Hygroton) 25 mg tablet     Other Visit Diagnoses         Other emphysema (Multi)    -  Primary    Relevant Medications     albuterol (Ventolin HFA) 90 mcg/actuation inhaler      Screening for colon cancer        Relevant Orders    Cologuard® colon cancer screening      Chronic venous insufficiency        Relevant Medications    chlorthalidone (Hygroton) 25 mg tablet    Other Relevant Orders    Basic metabolic panel      Elevated glucose                 As per hpi, call concerns.       Annamaria An MD          [1] No Known Allergies  [2]   Current Outpatient Medications on File Prior to Visit   Medication Sig Dispense Refill    amLODIPine (Norvasc) 10 mg tablet Take 1 tablet (10 mg) by mouth once daily. 90 tablet 3    ascorbic acid (Vitamin C) 500 mg tablet Take by mouth.      atorvastatin (Lipitor) 20 mg tablet Take 1 tablet (20 mg) by mouth once daily. 90 tablet 3    buPROPion XL (Wellbutrin XL) 150 mg 24 hr tablet take 1 tablet by mouth once daily DO NOT CRUSH, CHEW, AND/OR DIVIDE 90 tablet 1    calcium citrate (Calcitrate) 200 mg (950 mg) tablet Take 1 tablet by mouth once daily.      docusate sodium (Colace) 100 mg capsule Take 1 capsule (100 mg) by mouth 2 times a day.      lisinopril 20 mg tablet Take 1 tablet (20 mg) by mouth 2 times a day. 60 tablet 11    omeprazole OTC (PriLOSEC OTC) 20 mg EC tablet TAKE AS DIRECTED       No current facility-administered medications on file prior to visit.   [3]   Patient Active Problem List  Diagnosis    Chronic low back pain    Essential hypertension    Gastroesophageal reflux disease without esophagitis    Hematuria    Left bundle-branch block    Obstructive sleep apnea syndrome    Stress at work    Former smoker

## 2025-08-07 ENCOUNTER — OFFICE VISIT (OUTPATIENT)
Dept: CARDIOLOGY | Facility: CLINIC | Age: 55
End: 2025-08-07
Payer: COMMERCIAL

## 2025-08-07 ENCOUNTER — APPOINTMENT (OUTPATIENT)
Dept: CARDIOLOGY | Facility: CLINIC | Age: 55
End: 2025-08-07
Payer: COMMERCIAL

## 2025-08-07 VITALS
BODY MASS INDEX: 29.54 KG/M2 | HEART RATE: 104 BPM | WEIGHT: 211 LBS | DIASTOLIC BLOOD PRESSURE: 64 MMHG | SYSTOLIC BLOOD PRESSURE: 142 MMHG | OXYGEN SATURATION: 99 % | HEIGHT: 71 IN

## 2025-08-07 DIAGNOSIS — I10 ESSENTIAL HYPERTENSION: Primary | ICD-10-CM

## 2025-08-07 DIAGNOSIS — R07.89 CHEST DISCOMFORT: ICD-10-CM

## 2025-08-07 DIAGNOSIS — R07.9 CHEST PAIN, UNSPECIFIED TYPE: ICD-10-CM

## 2025-08-07 NOTE — PROGRESS NOTES
"Chief Complaint   Patient presents with    1 year follow up     Dr Leon pt. SULY swelling in lower extrem      HPI:  I was requested by Dr. An to evaluate this patient in consultation for cardiac assessment.    Mr. Josh Skinner is a 54 y.o. year old former smoker male patient with past medical history significant for hypertension, coming for establishment of care. History of premature CAD in his family. Echocardiogram performed October 2022 shows preserved biventricular function. Complains of chronic atypical chest pain. CT calcium scoring is zero. EKG showing sinus rhythm with LBBB.    Patient still complaining of atypical chest pain and SOB on exertion, however symptoms are deemed to be atypical. Notably, complained of elevated BP, tried to take Chlorthalidone, however BP dropped to ~110mmHg and he felt dizzy and with cramps, so he stopped it and is not willing to restart it.    Past Medical History  Medical History[1]    Past Surgical History  Surgical History[2]    Past Family History  Family History[3]    Allergy History  Allergies[4]    Past Social History  Social History[5]    Tobacco Use History[6]    Review of Systems:  A total of 12 systems have been reviewed and are negative except for the aforementioned findings described in HPI.     Objective Data:  Last Recorded Vitals:  Vitals:    08/07/25 1410   BP: 142/64   BP Location: Right arm   Pulse: 104   SpO2: 99%   Weight: 95.7 kg (211 lb)   Height: 1.803 m (5' 11\")       Last Labs:  CBC - 12/26/2024:  7:36 AM  7.8 14.7 320    43.5      CMP - 12/26/2024:  7:36 AM  8.8 _ _ --- _   _ _ _ _      PTT - No results in last year.  _   _ _     HGBA1C   Date/Time Value Ref Range Status   12/19/2023 06:10 AM 5.4 see below % Final     LDLCALC   Date/Time Value Ref Range Status   12/26/2024 07:36 AM 46 <=99 mg/dL Final     Comment:                                 Near   Borderline      AGE      Desirable  Optimal    High     High     Very High     0-19 Y     0 - " 109     ---    110-129   >/= 130     ----    20-24 Y     0 - 119     ---    120-159   >/= 160     ----      >24 Y     0 -  99   100-129  130-159   160-189     >/=190     12/19/2023 06:10 AM 48 <=99 mg/dL Final     Comment:                                 Near   Borderline      AGE      Desirable  Optimal    High     High     Very High     0-19 Y     0 - 109     ---    110-129   >/= 130     ----    20-24 Y     0 - 119     ---    120-159   >/= 160     ----      >24 Y     0 -  99   100-129  130-159   160-189     >/=190       VLDL   Date/Time Value Ref Range Status   12/26/2024 07:36 AM 10 0 - 40 mg/dL Final   12/19/2023 06:10 AM 13 0 - 40 mg/dL Final   08/24/2022 06:20 AM 19 0 - 40 mg/dL Final        Patient Medications:  Encounter Medications[7]    Physical Exam:  General: alert, oriented and in no acute distress  HEENT: NC/AT; EOMI; PERRLA, external ear is normal  Neck: supple; trachea midline; no masses; no JVD  Chest: clear breath sounds bilaterally; no wheezing  Cardio: regular rhythm, S1S2 normal, no murmurs  Abdomen: Soft, non-tender, non-distension, no organomegaly  Extremities: no clubbing/cyanosis/edema  Neuro: Grossly intact     Psychiatric: Normal mood and affect     Past Cardiology Results (Last 3 Years):  EKG:  ECG 12 lead (Clinic Performed) 08/08/2024    Echo:  Echo Results:  No results found for this or any previous visit from the past 365 days.     Cath:  No results found for this or any previous visit from the past 1095 days.    CV NCDR CATHPCI V5 COLLECTION FORM   Stress Test:  No results found for this or any previous visit from the past 1095 days.    Cardiac Imaging:  CT ANGIO HEART CORONARY 01/19/2023       Assessment/Plan     Mr. Josh Skinner is a 54 y.o. year old former smoker male patient with past medical history significant for hypertension, coming for establishment of care. History of premature CAD in his family. Echocardiogram performed October 2022 shows preserved biventricular function.  Complains of chronic atypical chest pain. CT calcium scoring is zero. EKG showing sinus rhythm with LBBB.    Assessment    # Atypical chest pain  - EKG showing sinus rhythm with old LBBB.  - CT calcium scoring is zero.  - We had an extensive discussion regarding the benefits adopting a healthy diet and lifestyle to avoid adverse cardiac events.   - Counseling.    # Hypertension  - Controlled blood pressure.  - Keep current medications including Lisinopril 20mg BID, Amlodipine 10mg daily.  - Notably, complained of elevated BP, tried to take Chlorthalidone, however BP dropped to ~110mmHg and he felt dizzy and with cramps, so he stopped it and is not willing to restart it.  - Patient counseled to keep a healthy lifestyle including regular exercise and low-sodium diet.  - Recommended home blood pressure monitoring.  - Goal of BP < 130/80mmHg.      We have discussed the most common side effects of the prescribed medications, indications, drug interactions, risks, complications, and alternatives of medications/therapeutics were explained and discussed. The patient has been requested to monitor closely for any untoward side effects or complications of medications. The patient has been strongly advised to be compliant with the recommendations, all the questions and concerns have been addressed. The patient has been also instructed to call, to return sooner or to go to the emergency department if symptoms persist or get worsen. The patient voiced understanding and denies any further questions at this time.     This note was transcribed using the Dragon Dictation system. There may be grammatical, punctuation, or verbiage errors that occur with voice recognition programs.    Counseling greater than 50% of visit regarding all cardiac issues.    Thank you, Dr. An, for allowing me to participate in the care of this patient. Please do not hesitate to contact me with any further questions or concerns.    Xiang Khan  MD [facility]       [1]   Past Medical History:  Diagnosis Date    Personal history of other diseases of the respiratory system 08/17/2022    History of pneumothorax    Tobacco use disorder 04/24/2023   [2]   Past Surgical History:  Procedure Laterality Date    CT ANGIO CORONARY ART WITH HEARTFLOW IF SCORE >30%  1/19/2023    CT HEART CORONARY ANGIOGRAM 1/19/2023 [facility]CTOR [facility]   [3]   Family History  Problem Relation Name Age of Onset    No Known Problems Mother      Heart disease Father      Diabetes Father      Heart disease Brother     [4] No Known Allergies  [5]   Social History  Socioeconomic History    Marital status:    Tobacco Use    Smoking status: Former     Types: Cigarettes    Smokeless tobacco: Never   Vaping Use    Vaping status: Never Used   Substance and Sexual Activity    Alcohol use: Yes     Comment: occasional    Drug use: Never   [6]   Social History  Tobacco Use   Smoking Status Former    Types: Cigarettes   Smokeless Tobacco Never   [7]   Outpatient Encounter Medications as of 8/7/2025   Medication Sig Dispense Refill    albuterol (Ventolin HFA) 90 mcg/actuation inhaler Inhale 2 puffs every 4 hours if needed for wheezing or shortness of breath. 8 g 5    amLODIPine (Norvasc) 10 mg tablet Take 1 tablet (10 mg) by mouth once daily. 90 tablet 3    ascorbic acid (Vitamin C) 500 mg tablet Take by mouth.      atorvastatin (Lipitor) 20 mg tablet Take 1 tablet (20 mg) by mouth once daily. 90 tablet 3    buPROPion XL (Wellbutrin XL) 150 mg 24 hr tablet take 1 tablet by mouth once daily DO NOT CRUSH, CHEW, AND/OR DIVIDE 90 tablet 1    calcium citrate (Calcitrate) 200 mg (950 mg) tablet Take 1 tablet by mouth once daily.      docusate sodium (Colace) 100 mg capsule Take 1 capsule (100 mg) by mouth 2 times a day.      lisinopril 20 mg tablet Take 1 tablet (20 mg) by mouth 2 times a day. 60 tablet 11    omeprazole OTC (PriLOSEC OTC) 20 mg EC tablet TAKE AS DIRECTED      chlorthalidone  (Hygroton) 25 mg tablet Take 0.5 tablets (12.5 mg) by mouth once daily. (Patient not taking: Reported on 8/7/2025) 15 tablet 3     No facility-administered encounter medications on file as of 8/7/2025.

## 2025-08-15 LAB — NONINV COLON CA DNA+OCC BLD SCRN STL QL: POSITIVE

## 2025-08-18 ENCOUNTER — RESULTS FOLLOW-UP (OUTPATIENT)
Age: 55
End: 2025-08-18
Payer: COMMERCIAL

## 2025-08-18 DIAGNOSIS — R19.5 POSITIVE COLORECTAL CANCER SCREENING USING COLOGUARD TEST: Primary | ICD-10-CM

## 2025-08-21 ENCOUNTER — APPOINTMENT (OUTPATIENT)
Age: 55
End: 2025-08-21
Payer: COMMERCIAL

## 2025-09-10 ENCOUNTER — APPOINTMENT (OUTPATIENT)
Dept: PRIMARY CARE | Facility: CLINIC | Age: 55
End: 2025-09-10
Payer: COMMERCIAL

## 2026-08-07 ENCOUNTER — APPOINTMENT (OUTPATIENT)
Dept: CARDIOLOGY | Facility: CLINIC | Age: 56
End: 2026-08-07
Payer: COMMERCIAL